# Patient Record
Sex: MALE | URBAN - METROPOLITAN AREA
[De-identification: names, ages, dates, MRNs, and addresses within clinical notes are randomized per-mention and may not be internally consistent; named-entity substitution may affect disease eponyms.]

---

## 2017-01-31 ENCOUNTER — IMPORTED ENCOUNTER (OUTPATIENT)
Dept: URBAN - METROPOLITAN AREA CLINIC 43 | Facility: CLINIC | Age: 77
End: 2017-01-31

## 2017-01-31 PROBLEM — H11.042: Noted: 2017-01-31

## 2017-03-07 ENCOUNTER — IMPORTED ENCOUNTER (OUTPATIENT)
Dept: URBAN - METROPOLITAN AREA CLINIC 43 | Facility: CLINIC | Age: 77
End: 2017-03-07

## 2017-03-07 PROBLEM — H16.223: Noted: 2017-03-07

## 2017-03-07 PROBLEM — H11.042: Noted: 2017-03-07

## 2017-12-20 NOTE — PROCEDURE NOTE: CLINICAL
PROCEDURE NOTE: Sub-Tenon’s Kenalog* OS. Diagnosis: Cystoid Macular Edema. Anesthesia: Topical. Prep: Betadine Flush. Prior to injection, risks/benefits/alternatives discussed including infection, loss of vision, hemorrhage, cataract, glaucoma, elevated intraocular pressure and lid swelling. Betadine prep was performed. Sub-Tenon’s injection of Kenalog 40 mg/1 ml was given. The remainder of the Sub-Tenon’s kenalog was discarded in the medical waste. Time of injection: 12:49 PM. Patient tolerated procedure well. There were no complications. Post-op instructions given. Patient given office phone number/answering service number and advised to call immediately should there be an increase in floaters or redness, loss of vision or pain, or should they have any other questions or concerns. Norbert Quispe

## 2019-01-28 ENCOUNTER — PREPPED CHART (OUTPATIENT)
Dept: URBAN - METROPOLITAN AREA CLINIC 32 | Facility: CLINIC | Age: 79
End: 2019-01-28

## 2019-01-28 ENCOUNTER — IMPORTED ENCOUNTER (OUTPATIENT)
Dept: URBAN - METROPOLITAN AREA CLINIC 43 | Facility: CLINIC | Age: 79
End: 2019-01-28

## 2019-01-28 PROBLEM — H43.812: Noted: 2019-01-28

## 2019-01-28 PROBLEM — H11.042: Noted: 2019-01-28

## 2019-01-28 PROBLEM — H25.12: Noted: 2019-01-28

## 2019-11-18 ASSESSMENT — VISUAL ACUITY
OD_BAT: 20/50-2
OD_SC: J7-1
OD_CC: J1+3
OD_SC: 20/30-2
OS_BAT: 20/60+1
OS_SC: 20/30+3
OS_CC: 20/30+2
OS_SC: J16
OS_CC: J1+1
OD_CC: 20/20-3

## 2019-11-18 ASSESSMENT — TONOMETRY
OS_IOP_MMHG: 16
OD_IOP_MMHG: 12

## 2019-11-18 ASSESSMENT — KERATOMETRY
OS_AXISANGLE_DEGREES: 58
OD_K2POWER_DIOPTERS: 43.25
OD_K1POWER_DIOPTERS: 44.25
OS_K2POWER_DIOPTERS: 44.25
OS_K1POWER_DIOPTERS: 44.75
OD_AXISANGLE_DEGREES: 84
OD_AXISANGLE2_DEGREES: 174
OS_AXISANGLE2_DEGREES: 148

## 2020-01-13 ENCOUNTER — ESTABLISHED COMPREHENSIVE EXAM (OUTPATIENT)
Dept: URBAN - METROPOLITAN AREA CLINIC 32 | Facility: CLINIC | Age: 80
End: 2020-01-13

## 2020-01-13 DIAGNOSIS — H11.042: ICD-10-CM

## 2020-01-13 DIAGNOSIS — H25.12: ICD-10-CM

## 2020-01-13 PROCEDURE — 92014 COMPRE OPH EXAM EST PT 1/>: CPT

## 2020-01-13 PROCEDURE — 92015 DETERMINE REFRACTIVE STATE: CPT

## 2020-01-13 ASSESSMENT — VISUAL ACUITY
OS_SC: J7-1
OD_BAT: 20/400
OS_SC: 20/40+2
OS_CC: 20/25-1
OS_BAT: 20/100
OD_CC: 20/25-2

## 2020-01-13 ASSESSMENT — KERATOMETRY
OS_AXISANGLE2_DEGREES: 145
OD_K2POWER_DIOPTERS: 43
OD_AXISANGLE2_DEGREES: 174
OS_K1POWER_DIOPTERS: 44.75
OD_AXISANGLE_DEGREES: 80
OS_AXISANGLE_DEGREES: 55
OD_AXISANGLE_DEGREES: 84
OS_AXISANGLE_DEGREES: 58
OS_K2POWER_DIOPTERS: 44.25
OS_AXISANGLE2_DEGREES: 148
OD_K1POWER_DIOPTERS: 44.25
OD_AXISANGLE2_DEGREES: 170
OD_K2POWER_DIOPTERS: 43.25

## 2020-01-13 ASSESSMENT — TONOMETRY
OD_IOP_MMHG: 11
OS_IOP_MMHG: 11

## 2020-04-19 ASSESSMENT — KERATOMETRY
OS_K2POWER_DIOPTERS: 44.25
OD_K2POWER_DIOPTERS: 43.25
OD_AXISANGLE_DEGREES: 174
OS_AXISANGLE2_DEGREES: 58
OD_K1POWER_DIOPTERS: 44.25
OS_AXISANGLE_DEGREES: 148
OS_K1POWER_DIOPTERS: 44.75
OD_AXISANGLE2_DEGREES: 84

## 2020-04-19 ASSESSMENT — VISUAL ACUITY
OD_CC: 20/30
OS_CC: 20/20 -2
OD_CC: @ 13"
OD_SC: J7-1
OS_CC: 20/25
OD_CC: 20/25
OD_OTHER: 20/50-2.
OD_CC: J1+3
OD_CC: 20/20-3
OD_CC: 20/20 -3
OS_SC: 20/30+3
OS_OTHER: 20/60+.
OS_CC: 20/30 +2
OS_CC: J1+1
OD_SC: 20/30-2
OS_SC: J16
OS_CC: 20/30+2
OS_CC: @ 13"

## 2020-04-19 ASSESSMENT — TONOMETRY
OD_IOP_MMHG: 11.0
OD_IOP_MMHG: 12.0
OD_IOP_MMHG: 11.0
OS_IOP_MMHG: 13.0
OS_IOP_MMHG: 12.0
OS_IOP_MMHG: 16.0

## 2020-10-12 NOTE — PATIENT DISCUSSION
Cost is a big issue for him. Sent message for Trulicity patient assistance. 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 We will try to get him controlled and then restart Trulicity if able to afford with patient assistance.   Goal A1C for age and comorbidities: 7 - 7.5%.  -- Reviewed goals of therapy are to get the best control we can without hypoglycemia  Medication changes:   Start   Glipizide 5 mg with breakfast and dinner. Do not take without food   Levemir 10 units daily. Instruction and demonstration provided in clinic today. Repeat demonstration was correct.  Discussed I would prefer 3 meals daily so you don't snack. Check blood sugar before breakfast, lunch, and dinner and record on log. Please bring with you next time. Encouraged to call me [phone] for blood sugar less than 70.    -- Reviewed patient's current insulin regimen. Clarified proper insulin dose and timing in relation to meals, etc. Insulin injection sites and proper rotation instructed.    -- Advised frequent self blood glucose monitoring.  Patient encouraged to document glucose results and bring them to every clinic visit    -- Hypoglycemia precautions discussed. Instructed on precautions before driving.    -- Call for Bg repeatedly < 90 or > 180.   -- Close adherence to lifestyle changes recommended.   -- Periodic follow ups for eye evaluations, foot care and dental care suggested.  -- Encouraged exercise as tolerated  -- Given information on low carb snacks and diabetic drinks    NO RECURRENT EDEMA OFF MEDS 8 14 18 OR 10 12 20.

## 2021-01-14 ENCOUNTER — ESTABLISHED COMPREHENSIVE EXAM (OUTPATIENT)
Dept: URBAN - METROPOLITAN AREA CLINIC 32 | Facility: CLINIC | Age: 81
End: 2021-01-14

## 2021-01-14 DIAGNOSIS — H04.123: ICD-10-CM

## 2021-01-14 DIAGNOSIS — H25.12: ICD-10-CM

## 2021-01-14 PROCEDURE — 92014 COMPRE OPH EXAM EST PT 1/>: CPT

## 2021-01-14 ASSESSMENT — VISUAL ACUITY
OU_CC: 20/25+2
OD_CC: J1+
OS_SC: J16
OU_CC: J1+
OS_GLARE: 20/400
OD_CC: 20/25-2
OS_CC: J1+
OS_SC: 20/40-2
OS_CC: 20/25-2

## 2021-01-14 ASSESSMENT — KERATOMETRY
OS_K2POWER_DIOPTERS: 44.25
OD_AXISANGLE2_DEGREES: 174
OD_AXISANGLE_DEGREES: 80
OD_K2POWER_DIOPTERS: 43.25
OS_AXISANGLE_DEGREES: 66
OS_AXISANGLE_DEGREES: 58
OS_AXISANGLE2_DEGREES: 148
OD_K1POWER_DIOPTERS: 44.25
OS_K1POWER_DIOPTERS: 44.75
OS_K2POWER_DIOPTERS: 44.00
OD_K2POWER_DIOPTERS: 43.50
OD_AXISANGLE2_DEGREES: 170
OS_AXISANGLE2_DEGREES: 145
OD_K2POWER_DIOPTERS: 43
OS_AXISANGLE2_DEGREES: 156
OD_AXISANGLE_DEGREES: 84
OD_AXISANGLE2_DEGREES: 167
OD_AXISANGLE_DEGREES: 77
OS_AXISANGLE_DEGREES: 55
OD_K1POWER_DIOPTERS: 44.50

## 2021-01-14 ASSESSMENT — TONOMETRY
OS_IOP_MMHG: 12
OD_IOP_MMHG: 10

## 2022-02-08 ENCOUNTER — COMPREHENSIVE EXAM (OUTPATIENT)
Dept: URBAN - METROPOLITAN AREA CLINIC 32 | Facility: CLINIC | Age: 82
End: 2022-02-08

## 2022-02-08 DIAGNOSIS — H25.12: ICD-10-CM

## 2022-02-08 PROCEDURE — 92014 COMPRE OPH EXAM EST PT 1/>: CPT

## 2022-02-08 PROCEDURE — 92015 DETERMINE REFRACTIVE STATE: CPT

## 2022-02-08 ASSESSMENT — KERATOMETRY
OD_AXISANGLE2_DEGREES: 167
OD_K1POWER_DIOPTERS: 44.25
OD_AXISANGLE2_DEGREES: 170
OS_AXISANGLE2_DEGREES: 156
OD_AXISANGLE_DEGREES: 84
OS_AXISANGLE_DEGREES: 58
OS_AXISANGLE2_DEGREES: 148
OD_K2POWER_DIOPTERS: 43.25
OS_AXISANGLE2_DEGREES: 145
OS_K2POWER_DIOPTERS: 44.25
OS_K1POWER_DIOPTERS: 44.75
OD_AXISANGLE_DEGREES: 77
OD_K2POWER_DIOPTERS: 43
OD_K1POWER_DIOPTERS: 44.50
OS_AXISANGLE_DEGREES: 55
OS_AXISANGLE_DEGREES: 66
OS_K2POWER_DIOPTERS: 44.00
OD_K2POWER_DIOPTERS: 43.50
OD_AXISANGLE2_DEGREES: 174
OD_AXISANGLE_DEGREES: 80

## 2022-02-08 ASSESSMENT — VISUAL ACUITY
OS_SC: 20/50
OD_SC: 20/40
OD_CC: 20/25
OU_CC: J1+
OS_CC: 20/25
OD_GLARE: 20/70
OS_CC: J1
OS_GLARE: 20/100
OD_CC: J1

## 2022-02-08 ASSESSMENT — TONOMETRY
OS_IOP_MMHG: 14
OD_IOP_MMHG: 11

## 2024-02-12 ENCOUNTER — COMPREHENSIVE EXAM (OUTPATIENT)
Dept: URBAN - METROPOLITAN AREA CLINIC 32 | Facility: CLINIC | Age: 84
End: 2024-02-12

## 2024-02-12 DIAGNOSIS — H25.12: ICD-10-CM

## 2024-02-12 DIAGNOSIS — H35.363: ICD-10-CM

## 2024-02-12 DIAGNOSIS — H43.812: ICD-10-CM

## 2024-02-12 DIAGNOSIS — H11.042: ICD-10-CM

## 2024-02-12 PROCEDURE — 92250 FUNDUS PHOTOGRAPHY W/I&R: CPT

## 2024-02-12 PROCEDURE — 92015 DETERMINE REFRACTIVE STATE: CPT

## 2024-02-12 PROCEDURE — 99214 OFFICE O/P EST MOD 30 MIN: CPT

## 2024-02-12 ASSESSMENT — VISUAL ACUITY
OS_SC: 20/60-2
OD_SC: J5
OS_CC: 20/40-2
OS_SC: J16
OS_CC: J3-1
OD_CC: J1+
OD_GLARE: 20/50
OD_CC: 20/40
OS_GLARE: 20/80
OD_SC: 20/40

## 2024-02-12 ASSESSMENT — KERATOMETRY
OD_AXISANGLE_DEGREES: 948
OD_K2POWER_DIOPTERS: 43.00
OS_K2POWER_DIOPTERS: 43.75
OS_K1POWER_DIOPTERS: 44.00
OD_K1POWER_DIOPTERS: 43.75
OS_AXISANGLE2_DEGREES: 131
OD_AXISANGLE2_DEGREES: 858
OS_AXISANGLE_DEGREES: 41

## 2024-02-12 ASSESSMENT — TONOMETRY
OD_IOP_MMHG: 11
OS_IOP_MMHG: 11

## 2025-02-17 ENCOUNTER — COMPREHENSIVE EXAM (OUTPATIENT)
Age: 85
End: 2025-02-17

## 2025-02-17 DIAGNOSIS — H16.223: ICD-10-CM

## 2025-02-17 DIAGNOSIS — H35.363: ICD-10-CM

## 2025-02-17 DIAGNOSIS — H11.042: ICD-10-CM

## 2025-02-17 DIAGNOSIS — H25.12: ICD-10-CM

## 2025-02-17 DIAGNOSIS — H43.812: ICD-10-CM

## 2025-02-17 PROCEDURE — 99214 OFFICE O/P EST MOD 30 MIN: CPT

## 2025-02-17 PROCEDURE — 92015 DETERMINE REFRACTIVE STATE: CPT
